# Patient Record
(demographics unavailable — no encounter records)

---

## 2024-10-21 NOTE — ASSESSMENT
[FreeTextEntry1] : Disscussed etiology and healing s/p surgery March 15th at [place]- she is aware that bones can continue to remodel for a year.  She returned to normal activity but continues to have pain in the 2nd toe The EMLA cream, ROM, compression and diclofenac gel have not helped Custom molded foot orthotics (no authorization as checked on 4/30/24) dispensed 8/20/24 have not helped Discussed surgical risks again including but not limited clot, infection, non healing, nerve damage she and her mother who is accompanying her understand  She would like to schedule the discussed surgery to have a date and if the pain resolves prior then the surgery would not be needed Plan repeat Xrays She is aware surgery would include arthrodesis 2nd toe right, Cavanaugh bunionectomy and scar revision. 1.5 hours with 2,2 [employer] screws

## 2024-10-21 NOTE — HISTORY OF PRESENT ILLNESS
[Sneakers] : richa [FreeTextEntry1] : Patient had bunionectomy, 5th metatarsal osteotomy and arthroplasty 2nd toe right foot  on 3/15/24 at [facility].  Patient is back in sneakers and continues to have pain at the 2nd digit, she states it is a constant pain. and worsens with walking   She states injections only helped for a couple days

## 2024-12-17 NOTE — HISTORY OF PRESENT ILLNESS
[Sneakers] : richa [FreeTextEntry1] : Patient had bunionectomy, 5th metatarsal osteotomy and arthroplasty 2nd toe right foot  on 3/15/24 at [facility].  Patient is scheduled for arthrodesis 2nd toe right, Cavanaugh bunionectomy and scar revision.12/20/24 at . Had PST done and also states she has been cleared by PCP  Patient is back in sneakers and continues to have pain at the 2nd digit, she states it is a constant pain. and worsens with walking

## 2024-12-23 NOTE — HISTORY OF PRESENT ILLNESS
[CAM Boot] : a CAM boot [FreeTextEntry1] : Patient had bunionectomy, 5th metatarsal osteotomy and arthroplasty 2nd toe right foot  on 3/15/24 at [facility].  Patient is scheduled for arthrodesis 2nd toe right, Cavanaugh bunionectomy and scar revision.12/20/24 at . Had PST done and also states she has been cleared by PCP  Patient is back in sneakers and continues to have pain at the 2nd digit, she states it is a constant pain. and worsens with walking    Update 12/23 Patient is present for post-op appointment. Patient had arthrodesis 2nd toe right, Cavanaugh bunionectomy and scar revision.12/20/24 at . Patient is present with dsd dressings and cam boot on. Patient is taking oral antibiotic twice a day and she is not taking pain medications only Tylenol as needed. Patient states she is having minimal pain.

## 2024-12-23 NOTE — PHYSICAL EXAM
[Ankle Swelling (On Exam)] : not present [Varicose Veins Of Lower Extremities] : not present [] : not present [2+] : left foot dorsalis pedis 2+ [de-identified] : 2nd toe right foot in good alignment reduced exostosis medial right foot mtpj #1 [FreeTextEntry1] : wounds copating well.  NSOI [Sensation] : the sensory exam was normal to light touch and pinprick

## 2024-12-23 NOTE — REASON FOR VISIT
[Post Operative Visit] : a post operative visit for [Other:___] : [unfilled] [FreeTextEntry2] : left foot s/p surgery

## 2024-12-23 NOTE — ASSESSMENT
[FreeTextEntry1] : Disscussed etiology and healing s/p surgery  Xrays taken 3 views right foot and reviewed showing good placment of the 2nd toe screw and reduced exostosis medial 1st met Reapplied DSD COntinue the po abx  limited walking in a CAM boot

## 2024-12-30 NOTE — HISTORY OF PRESENT ILLNESS
[CAM Boot] : a CAM boot [FreeTextEntry1] : Patient had bunionectomy, 5th metatarsal osteotomy and arthroplasty 2nd toe right foot  on 3/15/24 at [facility].  Patient is scheduled for arthrodesis 2nd toe right, Cavanaugh bunionectomy and scar revision.12/20/24 at . Had PST done and also states she has been cleared by PCP  Patient is back in sneakers and continues to have pain at the 2nd digit, she states it is a constant pain. and worsens with walking    Update 12/23 Patient is present for post-op appointment. Patient had arthrodesis 2nd toe right, Cavanaugh bunionectomy and scar revision.12/20/24 at . Patient is present with dsd dressings and cam boot on. Patient is taking oral antibiotic twice a day and she is not taking pain medications only Tylenol as needed. Patient states she is having minimal pain.   Update 12/30 Patient present with dsd dressing and cam boot on. Patient will be finish with her antibiotic tonight. Patient states she is having minimal pain.

## 2024-12-30 NOTE — ASSESSMENT
[FreeTextEntry1] : Disscussed etiology and healing s/p surgery  Xrays taken 3 views right foot and reviewed showing good placment of the 2nd toe screw and reduced exostosis medial 1st met Reapplied DSD limited walking may switch to the surgical shoe

## 2024-12-30 NOTE — PHYSICAL EXAM
[2+] : left foot dorsalis pedis 2+ [Sensation] : the sensory exam was normal to light touch and pinprick [Ankle Swelling (On Exam)] : not present [Varicose Veins Of Lower Extremities] : not present [] : not present [de-identified] : 2nd toe right foot in good alignment reduced exostosis medial right foot mtpj #1 [FreeTextEntry1] : wounds copating well.  NSOI

## 2025-01-06 NOTE — HISTORY OF PRESENT ILLNESS
[Post-op Sandals] : post-op sandals [FreeTextEntry1] : Patient had bunionectomy, 5th metatarsal osteotomy and arthroplasty 2nd toe right foot  on 3/15/24 at [facility].  Patient is scheduled for arthrodesis 2nd toe right, Cavanaugh bunionectomy and scar revision.12/20/24 at . Had PST done and also states she has been cleared by PCP  Patient is back in sneakers and continues to have pain at the 2nd digit, she states it is a constant pain. and worsens with walking    Update 12/23 Patient is present for post-op appointment. Patient had arthrodesis 2nd toe right, Cavanaugh bunionectomy and scar revision.12/20/24 at . Patient is present with dsd dressings and cam boot on. Patient is taking oral antibiotic twice a day and she is not taking pain medications only Tylenol as needed. Patient states she is having minimal pain.   Update 12/30 Patient present with dsd dressing and cam boot on. Patient will be finish with her antibiotic tonight. Patient states she is having minimal pain.   Update 01/06 Patient is present with dsd dressings and surgical shoe on. Patient completed antibiotic. Patient is having minimal pain.

## 2025-01-06 NOTE — ASSESSMENT
[FreeTextEntry1] : Disscussed etiology and healing s/p surgery  sutures removed Reapplied DSD limited walking may switch to the surgical shoe for 7 more days then transition to sneaker as directed follow-up 3-4 weeks

## 2025-01-06 NOTE — PHYSICAL EXAM
[2+] : left foot dorsalis pedis 2+ [Sensation] : the sensory exam was normal to light touch and pinprick [Ankle Swelling (On Exam)] : not present [Varicose Veins Of Lower Extremities] : not present [] : not present [de-identified] : 2nd toe right foot in good alignment reduced exostosis medial right foot mtpj #1 [FreeTextEntry1] : wounds copating well.  NSOI [Oriented To Time, Place, And Person] : oriented to person, place, and time

## 2025-01-30 NOTE — HISTORY OF PRESENT ILLNESS
[Post-op Sandals] : post-op sandals [FreeTextEntry1] : Patient had bunionectomy, 5th metatarsal osteotomy and arthroplasty 2nd toe right foot  on 3/15/24 at [facility]. Patient had arthrodesis 2nd toe right, Cavanaugh bunionectomy and scar revision.12/20/24 at . Patient is present with concern of infection on her 2nd toe.  She states about 2 weeks ago she banged her second toe into a stool and since has had redness and pain at the second toe

## 2025-01-30 NOTE — PHYSICAL EXAM
[2+] : left foot dorsalis pedis 2+ [Sensation] : the sensory exam was normal to light touch and pinprick [Oriented To Time, Place, And Person] : oriented to person, place, and time [Ankle Swelling (On Exam)] : not present [Varicose Veins Of Lower Extremities] : not present [] : not present [de-identified] : 1st ray and 2nd toe right foot in good alignment distal tuft of the 2nd toe right is red with swelling and sharla.  no portal of entry or signs of infection [FreeTextEntry1] : wounds copating well.  NSOI

## 2025-01-30 NOTE — ASSESSMENT
[FreeTextEntry1] : Prescription for x-rays of the right foot were sent which she will get and it will be reviewed. She has an appointment to follow-up with me on Monday

## 2025-02-03 NOTE — ASSESSMENT
[FreeTextEntry1] : Prescription for x-rays of the right foot were sent which she will get and it will be reviewed. reviewed Xrays 3 views right foot on  website -  No change is hardware position form prior December films in the office.   will schedule removal of painful hardware 2nd toe after authorization right foot Rx PO Cefdinir to be stated today and continued for 10 days

## 2025-02-03 NOTE — PHYSICAL EXAM
[2+] : left foot dorsalis pedis 2+ [Sensation] : the sensory exam was normal to light touch and pinprick [Oriented To Time, Place, And Person] : oriented to person, place, and time [Ankle Swelling (On Exam)] : not present [Varicose Veins Of Lower Extremities] : not present [] : not present [de-identified] : 1st ray and 2nd toe right foot in good alignment distal tuft of the 2nd toe right is red with swelling and sharla.  no portal of entry or signs of infection [FreeTextEntry1] : wounds copating well.  NSOI

## 2025-02-03 NOTE — HISTORY OF PRESENT ILLNESS
[Other: ____] : [unfilled] [FreeTextEntry1] : Patient had bunionectomy, 5th metatarsal osteotomy and arthroplasty 2nd toe right foot on 3/15/24 at [facility]. Patient had arthrodesis 2nd toe right, Cavanaugh bunionectomy and scar revision.12/20/24 at . Patient is present with concern of infection on her 2nd toe.  She states about 2 weeks ago she banged her second toe into a stool and since has had redness and pain at the second toe   Update 02/03 Patient states she is following up for foot pain on her second toe. Patient is present with crocs on. Patient had x-rays done at [facility] on Friday afternoon. Patient is complaining of pain at the moment. Patient pain scale 1 out of 10 is an 8. Patient states the toe is red and swollen.

## 2025-02-07 NOTE — REASON FOR VISIT
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

## 2025-02-07 NOTE — HISTORY OF PRESENT ILLNESS
[Other: ____] : [unfilled] [FreeTextEntry1] : Patient had bunionectomy, 5th metatarsal osteotomy and arthroplasty 2nd toe right foot on 3/15/24 at [facility]. Patient had arthrodesis 2nd toe right, Cavanaugh bunionectomy and scar revision.12/20/24 at .  Patient was doing fine until the end of January when she banged her second toe on a stool.  She had x-rays taken at [facility] as well as in the office not showing any fractures and good placement of the screw and fusion of the IP joint.  She continued to have pain and swelling at the distal tuft of her second toe and was given a prescription for an antibiotic which did not change the redness or swelling.  She is still taking the antibiotic today and would like to have the removal of the screw as discussed.  She is accompanied by her mother.

## 2025-02-07 NOTE — ASSESSMENT
[FreeTextEntry1] : Patient in the presence of her mother was given risks associated with this surgical hardware including but not limited to infection, scar, pain.  The patient agrees to the procedure and has signed consent. The right second toe was blocked with 1% plain lidocaine and quarter percent Marcaine 50-50 mixture in a digital block. Proper timeouts were taken identifying the right side as the correct side. A well-padded ankle tourniquet was placed on the patient's right side and set to 250 mmHg.  The foot was prepped and draped in usual sterile manner.  The foot was exsanguinated and the tourniquet elevated.  Again timeout was taken identifying the second toe as the correct side.  And an incision was made at the distal aspect.  With a fresh blade it was deepened down to the bone and the 2.2 met artery screwdriver was utilized to remove the screw.  It should be noted that the screw was loose and could be the reason for her swelling and pain wound was irrigated the tourniquet was deflated and repaired using 4.0 nylon in a simple interrupted fashion.  Xeroform and dry sterile dressings were applied to the patient's right foot post procedure instructions were given she will continue the limited activity with the surgical shoe as directed and follow-up in 1 week Continue PO Cefdinir to be stated today and continued for 10 days

## 2025-02-07 NOTE — PHYSICAL EXAM
[2+] : left foot dorsalis pedis 2+ [Sensation] : the sensory exam was normal to light touch and pinprick [Oriented To Time, Place, And Person] : oriented to person, place, and time [Ankle Swelling (On Exam)] : not present [Varicose Veins Of Lower Extremities] : not present [] : not present [de-identified] : 1st ray and 2nd toe right foot in good alignment distal tuft of the 2nd toe right is red with swelling and sharla.  no portal of entry or signs of infection [FreeTextEntry1] : Unchanged persistent swelling and pain at the distal aspect of the second toe with localized redness distal to the DIPJ without open wound or signs of infection

## 2025-02-18 NOTE — PHYSICAL EXAM
[General Appearance - Alert] : alert [General Appearance - In No Acute Distress] : in no acute distress [2+] : left foot dorsalis pedis 2+ [Sensation] : the sensory exam was normal to light touch and pinprick [Oriented To Time, Place, And Person] : oriented to person, place, and time [Ankle Swelling (On Exam)] : not present [Varicose Veins Of Lower Extremities] : not present [] : not present [de-identified] : 1st ray and 2nd toe right foot in good alignment with pipj stable   [FreeTextEntry1] : Decreased swelling and pain at the distal aspect of the second toe with sutures intact

## 2025-02-18 NOTE — HISTORY OF PRESENT ILLNESS
[Post-op Sandals] : post-op sandals [FreeTextEntry1] : Patient had bunionectomy, 5th metatarsal osteotomy and arthroplasty 2nd toe right foot on 3/15/24 at [facility]. Patient had arthrodesis 2nd toe right, Cavanaugh bunionectomy and scar revision.12/20/24 at .    Patient had screw removed on 2/7/25 in office, completed antibiotic, Swelling and pain resolved . She is ambulating in sneakers no dressing on  she states the reddness and pain in the 2nd toe resolved.

## 2025-02-18 NOTE — ASSESSMENT
[FreeTextEntry1] : Patient in the presence of her mother was given risks associated with this surgical hardware including but not limited to infection, scar, pain.   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